# Patient Record
Sex: FEMALE | Race: ASIAN | URBAN - METROPOLITAN AREA
[De-identification: names, ages, dates, MRNs, and addresses within clinical notes are randomized per-mention and may not be internally consistent; named-entity substitution may affect disease eponyms.]

---

## 2019-05-30 PROBLEM — L65.9 HAIR THINNING: Status: ACTIVE | Noted: 2019-05-30

## 2019-05-30 PROBLEM — R53.83 FATIGUE, UNSPECIFIED TYPE: Status: ACTIVE | Noted: 2019-05-30

## 2019-05-30 PROBLEM — L85.3 DRY SKIN: Status: ACTIVE | Noted: 2019-05-30

## 2019-05-30 PROBLEM — F90.9 ATTENTION DEFICIT HYPERACTIVITY DISORDER (ADHD), UNSPECIFIED ADHD TYPE: Status: ACTIVE | Noted: 2019-05-30

## 2019-05-31 PROBLEM — E61.1 LOW IRON: Status: ACTIVE | Noted: 2019-05-31

## 2019-05-31 PROBLEM — E55.9 VITAMIN D DEFICIENCY: Status: ACTIVE | Noted: 2019-05-31

## 2020-06-02 PROBLEM — L65.9 HAIR THINNING: Status: RESOLVED | Noted: 2019-05-30 | Resolved: 2020-06-02

## 2020-06-02 PROBLEM — L85.3 DRY SKIN: Status: RESOLVED | Noted: 2019-05-30 | Resolved: 2020-06-02

## 2024-05-31 ENCOUNTER — OFFICE VISIT (OUTPATIENT)
Facility: CLINIC | Age: 29
End: 2024-05-31
Payer: COMMERCIAL

## 2024-05-31 VITALS
BODY MASS INDEX: 20.49 KG/M2 | OXYGEN SATURATION: 99 % | RESPIRATION RATE: 18 BRPM | HEIGHT: 57 IN | WEIGHT: 95 LBS | DIASTOLIC BLOOD PRESSURE: 80 MMHG | HEART RATE: 101 BPM | SYSTOLIC BLOOD PRESSURE: 128 MMHG

## 2024-05-31 DIAGNOSIS — R73.03 PREDIABETES: Primary | ICD-10-CM

## 2024-05-31 PROCEDURE — 99204 OFFICE O/P NEW MOD 45 MIN: CPT | Performed by: STUDENT IN AN ORGANIZED HEALTH CARE EDUCATION/TRAINING PROGRAM

## 2024-05-31 PROCEDURE — 3008F BODY MASS INDEX DOCD: CPT | Performed by: STUDENT IN AN ORGANIZED HEALTH CARE EDUCATION/TRAINING PROGRAM

## 2024-05-31 PROCEDURE — 3074F SYST BP LT 130 MM HG: CPT | Performed by: STUDENT IN AN ORGANIZED HEALTH CARE EDUCATION/TRAINING PROGRAM

## 2024-05-31 PROCEDURE — 3079F DIAST BP 80-89 MM HG: CPT | Performed by: STUDENT IN AN ORGANIZED HEALTH CARE EDUCATION/TRAINING PROGRAM

## 2024-05-31 RX ORDER — LACTULOSE 10 G/15ML
SOLUTION ORAL
COMMUNITY
Start: 2024-05-24

## 2024-05-31 RX ORDER — DOCUSATE SODIUM 100 MG/1
CAPSULE, LIQUID FILLED ORAL
COMMUNITY

## 2024-05-31 RX ORDER — BECLOMETHASONE DIPROPIONATE HFA 40 UG/1
AEROSOL, METERED RESPIRATORY (INHALATION)
COMMUNITY

## 2024-05-31 RX ORDER — CHOLECALCIFEROL (VITAMIN D3) 125 MCG
CAPSULE ORAL
COMMUNITY

## 2024-05-31 RX ORDER — LEVOMEFOLATE CALCIUM 7.5 MG TABLET
TABLET
COMMUNITY

## 2024-05-31 RX ORDER — ALBUTEROL SULFATE 90 UG/1
2 AEROSOL, METERED RESPIRATORY (INHALATION) EVERY 4 HOURS PRN
COMMUNITY
Start: 2024-05-10

## 2024-05-31 RX ORDER — LISDEXAMFETAMINE DIMESYLATE 30 MG/1
CAPSULE ORAL
COMMUNITY

## 2024-05-31 NOTE — PATIENT INSTRUCTIONS
We'll have you do genetic testing to rule out any question of BHARATHI.   If you ultimately do have any form of BHARATHI, please do make another appointment with me.   Otherwise, if this is \"classic\" prediabetes, please follow with your primary care physician for further management.     Return Visit   [X] No follow up needed for now  [X] After visit summary

## 2024-06-01 NOTE — H&P
Endocrinology Clinic Note    Name: Marni Brennan    Date: 5/31/2024       HISTORY OF PRESENT ILLNESS   Marni Brennan is a 28 year old female with PMHx significant for anxiety, ADD, dermographia who presents for initial endocrine consultation for prediabetes.  The patient recently had lab testing done with an A1c of 6.3%, reports it has been in diabetic range in the past.  Her fasting sugar was 125 with a concurrent insulin level of 4.3, reports that another provider told her that that insulin level was insufficient for her fasting sugar and that she should seek evaluation.  Reports that she and her brother were both diagnosed with hyperglycemia as children and A1c has been mildly elevated all her life.  She has always had a small body habitus (currently weighs 95 pounds) and reports that she was never obese as a child.  Reports that her mother has diabetes which is controlled by diet and also had gestational diabetes.  She is not familiar with the medical history of her grandparents but does not think that either of her grandfathers were diabetic.  She is very interested in genetic testing to ensure that there is no genetic or syndromic basis for her diabetes as she is very active, eats very healthy, etc. and is concerned by her longstanding prediabetes.  She does not use exogenous steroids.  No other acute issues at this time.    PAST MEDICAL HISTORY:   Past Medical History:    ADD (attention deficit disorder)    Anxiety    Dermagraphy    Iron deficiency anemia    Vitamin D deficiency       PAST SURGICAL HISTORY:   No past surgical history on file.    CURRENT MEDICATIONS:    Current Outpatient Medications   Medication Sig Dispense Refill    albuterol 108 (90 Base) MCG/ACT Inhalation Aero Soln Inhale 2 puffs into the lungs every 4 (four) hours as needed for Wheezing.      QVAR REDIHALER 40 MCG/ACT Inhalation Aerosol, Breath Activated       Cholecalciferol (VITAMIN D) 125 MCG (5000 UT) Oral Cap       L-Methylfolate 7.5 MG  Oral Tab       docusate sodium (COLACE) 100 MG Oral Cap       CONSTULOSE 10 GM/15ML Oral Solution 15 mL Orally Once a day 1 days      VYVANSE 30 MG Oral Cap       venlafaxine ER 75 mg       Fexofenadine HCl 180 MG Oral Tab            ALLERGIES:  No Known Allergies    SOCIAL HISTORY:    Social History     Socioeconomic History    Marital status: Single    Number of children: 0   Occupational History    Occupation: consulting   Tobacco Use    Smoking status: Never    Smokeless tobacco: Never   Vaping Use    Vaping status: Never Used   Substance and Sexual Activity    Alcohol use: Not Currently     Comment: Social drinker    Drug use: Never    Sexual activity: Yes     Partners: Male     Birth control/protection: Condom, Paragard     Comment: paragard 2020   Other Topics Concern     Service No    Blood Transfusions No    Seat Belt Yes       FAMILY HISTORY:   Family History   Problem Relation Age of Onset    Other (prediabetes) Mother     High Blood Pressure Father     Dementia Paternal Grandmother         alzheimers    Cancer Paternal Grandfather          REVIEW OF SYSTEMS:  Ten point review of systems has been performed and is otherwise negative and/or non-contributory, except as described above.      PHYSICAL EXAM:   Vitals:    05/31/24 1023   BP: 128/80   Pulse: 101   Resp: 18   SpO2: 99%   Weight: 95 lb (43.1 kg)   Height: 4' 9\" (1.448 m)     BMI: Body mass index is 20.56 kg/m².     CONSTITUTIONAL:  awake, alert, cooperative, no apparent distress, and appears stated age  PSYCH: normal affect  EYES:  No proptosis,  conjunctiva normal  ENT:  Normocephalic, atraumatic  NECK:  Supple, symmetrical, thyroid not enlarged and no tenderness  LUNGS: breathing comfortably  CARDIOVASCULAR:  regular rate   ABDOMEN:  soft  SKIN: + Scarring/lesions on face and neck  EXTREMITIES: no edema      DATA:     Labs from 5/3/2024:  -TFTs normal  -Iron studies normal  -Lipid panel normal  -Fasting glucose 125  -Hemoglobin A1c  6.3%  -Hemoglobin normal  -Vitamin D 29  -Insulin level 4.3      ASSESSMENT AND PLAN:    #Early onset diabetes  -Patient reports she and her brother were both diagnosed with notable hyperglycemia as children and she is continue to have elevated A1c in prediabetic and occasionally diabetic range into adulthood despite having a very lean body type (BMI of approximately 20), controlling her diet strictly, and being very active (daily Pilates)  -Reports a family history of diabetes in her mother, does not know if either of her grandmothers have diabetes as she does not have contact with them but reports that her grandfather's were not diabetic  -She is concerned about NATHAN and other forms of type 1 diabetes  -Extensively reviewed available data with the patient, she does not have evidence of insulin deficiency at this time with appropriate insulin elevation in the face of mild hyperglycemia  -Patient is very interested in genetic testing to rule out any genetic basis of her diabetes, reviewed that she does not have clear signs of BHARATHI at this time (does not have true diabetic range A1c, no suspicious birth history, etc.) however patient would like to have genetic testing done  -Patient would like to have type I evaluation done, will obtain autoantibodies  -If no evidence of a genetic basis for diabetes/BHARATHI, recommend patient continue to manage prediabetes in a conservative manner versus consider metformin therapy which can be done with her primary care physician  -Reviewed diet recommendations  -All questions answered in detail    #Vitamin D deficiency  -Advised patient to take over-the-counter daily vitamin D supplementation    The above plan was discussed in detail with the patient who verbalized understanding and agreement.      A total of 50 minutes was spent today on obtaining history, reviewing pertinent labs, reviewing relevant pathophysiology with patient, evaluating patient, providing multiple treatment  options, and completing documentation and orders.      Saskia Gomez DO  [facility] Endocrinology  5/31/2024     Note to patient: The 21 Century Cures Act makes medical notes like these available to patients in the interest of transparency. However, be advised this is a medical document. It is intended as peer to peer communication. It is written in medical language and may contain abbreviations or verbiage that are unfamiliar. It may appear blunt or direct. Medical documents are intended to carry relevant information, facts as evident, and the clinical opinion of the practitioner.

## 2024-06-02 PROBLEM — R73.03 PREDIABETES: Status: ACTIVE | Noted: 2024-06-02

## 2024-06-03 ENCOUNTER — TELEPHONE (OUTPATIENT)
Facility: CLINIC | Age: 29
End: 2024-06-03

## 2024-06-03 NOTE — TELEPHONE ENCOUNTER
Prateek herrera, please let Marni know that I have ordered some blood work for her as the diagnosis of BHARATHI requires demonstration of negative type I autoantibodies.  If any of these antibodies result positive, we can make a follow-up appointment to discuss.  She already has the referral for genetics if she would like to schedule that at her convenience.     RN phoned patient to discuss, no answer, LVM for call back tomorrow or to review MCM

## 2024-06-05 ENCOUNTER — MED REC SCAN ONLY (OUTPATIENT)
Facility: CLINIC | Age: 29
End: 2024-06-05

## 2024-06-12 NOTE — TELEPHONE ENCOUNTER
RN phoned patient to discuss below, patient verbalized understanding    [facility] [address] [facility]  [address]  [place] [address]  Phone   [phone]  Fax   [phone]    Lab orders faxed to [facility] in [place].    Will await confirmation

## 2024-06-25 ENCOUNTER — TELEPHONE (OUTPATIENT)
Facility: CLINIC | Age: 29
End: 2024-06-25

## 2024-06-25 NOTE — TELEPHONE ENCOUNTER
6/25/24 rcvd via fax from [employer] [facility] [employer]     Notice of Test Delay - Islet Cell AB Screen    Fax in [address]

## 2024-06-25 NOTE — TELEPHONE ENCOUNTER
Fax states Islet Cell Antibody Screen With Reflex to [facility] is on delay, \"please add 2-5 days to turn around time.\".    Routing to Dr. Gomez and DALI.    Closing this encounter.

## 2024-07-03 LAB
GLUTAMIC ACID DECARBOXYLASE 65 AB: <5 IU/ML
ISLET CELL ANTIBODY SCREEN: NEGATIVE
ZINC TRANSPORTER 8 (ZNT8) ANTIBODY: <10 U/ML

## 2024-09-06 ENCOUNTER — GENETICS ENCOUNTER (OUTPATIENT)
Dept: GENETICS | Facility: HOSPITAL | Age: 29
End: 2024-09-06
Attending: STUDENT IN AN ORGANIZED HEALTH CARE EDUCATION/TRAINING PROGRAM
Payer: COMMERCIAL

## 2024-09-06 ENCOUNTER — NURSE ONLY (OUTPATIENT)
Dept: HEMATOLOGY/ONCOLOGY | Facility: HOSPITAL | Age: 29
End: 2024-09-06
Attending: STUDENT IN AN ORGANIZED HEALTH CARE EDUCATION/TRAINING PROGRAM
Payer: COMMERCIAL

## 2024-09-06 DIAGNOSIS — R73.03 PREDIABETES: Primary | ICD-10-CM

## 2024-09-06 DIAGNOSIS — Z83.3 FAMILY HISTORY OF DIABETES MELLITUS: ICD-10-CM

## 2024-09-06 PROCEDURE — 96040 HC GENETIC COUNSELING EA 30 MIN: CPT | Performed by: GENETIC COUNSELOR, MS

## 2024-09-06 PROCEDURE — 36415 COLL VENOUS BLD VENIPUNCTURE: CPT

## 2024-09-06 NOTE — PROGRESS NOTES
Patient Name: Marni Brennan  YOB: 1995  Date of Visit: 2024    Reason for visit: Ms. Brennan was seen for the purposes of genetic counseling due to a clinical suspicion of BHARATHI.    Referring Provider: Saskia Gomez    Medical History: Ms. Brennan is a pleasant 28 year old female presenting with a history of hyperglycemia as early as middle school. She notes she did not routinely go to the doctor as a child. Her A1C is elevated, most recently in 2024 at 6.3 in the pre-diabetes range. She reports prior A1C levels in the pre-diabetic to diabetic range. She has a lean body habitus and eats well/exercises (BMI 33, 4'9\", 95 lb). She has had negative type 1 auto-antibody tests. Her endocrinologist referred her for a genetic evaluation for BHARATHI given her clinical picture and family history.     Ms. Brennan achieved menarche at approximately 11-12 years of age, is pre-menopausal, and has never been pregnant.     Relevant Family History: Ms. Brennan reports limited contact with her family and her family does not routinely go to the doctor, so family medical hx info is more limited.     Ms. Brennan has 1 brother (31y) he also has a h/o hyperglycemia beginning in childhood (middle school), he has a thin body habitus as well, he does not routinely go to the doctor.    Ms. Brennan's mother (61y) has a h/o hyperglycemia and suspected diabetes which is diet controlled, she had gestational diabetes as well and has a non-specific vision issue. There are 6-7 maternal aunts/uncle, no/limited health hx information is known about them and their children. The maternal grandmother is >50y. The maternal grandfather is .     Ms. Brennan's father (60y) has a h/o HTN, he does not go to the doctor, he had a prior MI. There are 6-7 maternal aunts/uncle, no/limited health hx information is known about them and their children. The paternal grandfather reported  young dt a cancer, possibly stomach. The paternal grandmother is living.     The rest  of the family history is negative for other significant genetic conditions, cancers, consanguinity, or birth defects of any kind. She denies any h/o kidney/UT defects or dysfunction, no major progressive ocular issues were noted, no deafness. See scanned pedigree for full family history reported during the session.    Ms. Brennan's maternal and paternal ethnicity is [place].     Summary:   I review the following information with Ms. Brennan. She was given printed information about BHARATHI as well.   Maturity onset diabetes of the young (BHARATHI)  Maturity onset diabetes of the young (BHARATHI), a primary pancreatic beta-cell defect, is the most common type of monogenic diabetes, accounting for up to 5% of young adults diagnosed with diabetes. BHARATHI typically presents in lean young adults before 25 years with an autosomal dominant family history. BHARATHI patients have continued production of endogenous insulin, absence of beta-cell autoimmunity and absence of signs of insulin resistance. As this non-insulin dependent type of diabetes is frequently misdiagnosed as type 1 or type 2 diabetes, a timely and accurate molecular diagnosis of BHARATHI is essential to treatment decisions, prognosis, family screening, and obstetric management of gestational diabetes. BHARATHI has been conventionally classified into 14 types in terms of the causative genes.    The early clarification of the genetic status of asymptomatic family members at risk for BHARATHI enables routine surveillance to identify hyperglycemia enables prompt and appropriate treatment based on the type of BHARATHI. For those at increased risk, early intervention reduces the long-term risk of hyperglycemia-related microvascular and macrovascular complications. Families with individuals with BHARATHI as well as the much more common type 1 and type 2 diabetes can be assured that each individual will receive the appropriate surveillance and therapy for his/her diagnosis.    Genetics/Inheritance   BHARATHI is  inherited in an autosomal dominant manner. Proteins encoded by BHARATHI-associated genes include nuclear transcription factors controlling pancreatic development (HNF1A, HNF4A, HNF1B, PDX1, KLF11, PAX4, NEUROD1, and RFX6), glucokinase (GCK, a glucose sensor in pancreatic beta-cells), insulin (INS), kinase stimulating insulin synthesis and secretion (BLK), the enzyme carboxyl vania lipase (TEDDY), the ATP-sensitive potassium (KATP) channels (ABCC8 and KCNJ11), and a protein involved in glucose metabolism (APPL1).     Approximately 20% of all BHARATHI has been attributed to pathogenic variants in ten other genes - some of which were designated before the availability of large-scale genetic testing and thus may be incorrectly associated with BHARATHI. Pathogenic variants in HNF1A, HNF4A, GCK, and HNF1B genes account for over 80% of all known BHARATHI cases while defects in other genes are rare causes of BHARATHI. A portion of BHARATHI may be caused by pathogenic variants in yet-to-be-identified genes or complex molecular alterations in the known BHARATHI-related genes that were not detected by previous genetic testing methods. Pathogenic variants in HNF1A, HNF4A, GCK, and HNF1B genes account for over 80% of all known BHARATHI cases while defects in other genes are rare causes of BHARATHI.     If testing is performed, three results are possible: positive, negative, and variant of uncertain significance.  A positive result indicates a pathogenic variant (harmful gene mutation) has been identified, and there is an increased risk for the condition(s) associated with the specific gene.  Since pathogenic variants in most BHARATHI susceptibility genes are inherited in an autosomal dominant fashion, siblings and children of individuals with a pathogenic variant have a 50% risk of carrying the same familial pathogenic variant as well.      A negative test result would indicate that no pathogenic variant was identified in a disease susceptibility gene.  While testing  detects gene mutations, it is possible for a genetic variant to be present and go undetected.  It is also possible for a genetic variant to be located in a gene other than those being tested.     A variant of uncertain significance means that a change has been identified a disease susceptibility gene; however, it is uncertain if the variant is pathogenic or a non-deleterious (benign) change.  With time, the variant may be reclassified as either pathogenic or benign.    Ms. Brennan appeared to understand the information presented. On the day of the visit Ms. Brennan elected to proceed with genetic testing for BHARATHI. My office will call Ms. Mika as soon as results are received; post-test counseling can be scheduled at that time. Thank you for allowing me to participate in the care of your patient; please do not hesitate to contact my office if you have any questions or concerns, [phone].    Plan:   Blood was drawn and sent out for [facility] [employer]'s monogenic diabetes panel (covers BHARATHI)(28 genes; TAT: ~2 weeks).    The Genetics office will call MsMauricio Mika when results are available.  Recommendations for Ms. Brennan and family members will depend the above genetic testing results.      Send to: Saskia Gomez/[facility]  Time spent with patient: 35 minutes      Brittany Mcneil MS, [facility]

## 2024-09-11 ENCOUNTER — APPOINTMENT (OUTPATIENT)
Dept: GENETICS | Facility: HOSPITAL | Age: 29
End: 2024-09-11
Attending: STUDENT IN AN ORGANIZED HEALTH CARE EDUCATION/TRAINING PROGRAM
Payer: COMMERCIAL

## 2024-09-17 ENCOUNTER — TELEPHONE (OUTPATIENT)
Dept: HEMATOLOGY/ONCOLOGY | Facility: HOSPITAL | Age: 29
End: 2024-09-17

## 2024-09-17 ENCOUNTER — GENETICS ENCOUNTER (OUTPATIENT)
Dept: HEMATOLOGY/ONCOLOGY | Facility: HOSPITAL | Age: 29
End: 2024-09-17

## 2024-09-17 DIAGNOSIS — Z15.89: Primary | ICD-10-CM

## 2024-09-17 DIAGNOSIS — E13.9 MODY (MATURITY ONSET DIABETES MELLITUS IN YOUNG) (HCC): ICD-10-CM

## 2024-09-17 NOTE — TELEPHONE ENCOUNTER
TONO asking Marni to call back to review her genetic testing results. My call back number is [phone].     -Brittany

## 2024-09-17 NOTE — TELEPHONE ENCOUNTER
Patient called back and I reviewed her genetic testing results with her. See genetics result note for details.

## 2024-09-17 NOTE — PROGRESS NOTES
Patient Name: Marni Brennan  YOB: 1995    Referring Provider:  Saskia Gomez DO      Reason for Referral:  Ms. Brennan had genetic testing performed on 9/6/2024 because of a clinical suspicion of BHARATHI.      Genetic Testing Result:  Positive. Ms. 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  No other known pathogenic variants were found in a total of 28 genes on the [facility] monogenic diabetes panel including: ABCC8, APPL1, BLK, YBA2TH0, FOXP3, GATA4, GATA6, GCK, GLIS3, HNF1A, HNF1B, HNF4A, YPU3XU1, INS, KCNJ11, KLF11, MNX1, NEUROD1, NEUROG3, NKX2-2, PAX4, PDX1, PPARG, PTF1A, RFX6, HYJ31M4, WFS1, ZFP57. Please refer to the report from [facility] for additional testing information.  These results were discussed with Ms. Brennan via telephone on 9/17/2024.    Summary and Plan:   Ms. 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 This information is important for Ms. Brennan and relatives to be aware of. She should discuss this result with her doctors.     The limitations of the testing were discussed with Ms. Brennan including the chance that additional pathogenic variants in a gene other than those included in this panel might be the cause of condition(s) in Ms. Brennan or in relatives.    GCK-associated maturity onset diabetes of the young (BHARATHI)   A single inactivating (loss-of-function) mutation in the GCK (glucokinase) gene, like Ms. Brennan has, leads to a type of autosomal dominant hyperglycemia (elevated blood sugar) called maturity-onset diabetes of the young (BHARATHI). The prevalence of GCK-BHARATHI has been estimated at 1:1,000 individuals [Chapis et al 2014].     GCK-BHARATHI (aka: MODY2) is characterized by mild, stable fasting hyperglycemia (5.5-8.0 mmol/L;  mg/dL) present at birth. Beta-cell function typically shows minimal  deterioration with increasing age (as in the general population). Insulin secretion and regulation are usually fully intact. The raised fasting glucose is not correlated with BMI so occurs to a similar degree in both slim and obese individuals.    Affected individuals are generally asymptomatic and the hyperglycemia is often discovered during routine medical examinations, such as in pregnancy or family screening when BHARATHI is suspected. GCK-related BHARATHI is often managed with diet alone, with fewer than 2% of individuals requiring insulin therapy.     Diabetes-related complications, such as retinopathy, are extremely uncommon as the hyperglycemia is mild, stable, and under homeostatic regulation. Additionally, the level of hyperglycemia is often lower than the threshold above which the risk of diabetes complications increases; and people with GCK-BHARATHI typically do not have the additional burden of the metabolic syndrome, with weight, lipid profile, and blood pressure being comparable with the general population [Chapis et al 2015].    The early clarification of the genetic status of asymptomatic family members at risk for GCK-BHARATHI enables routine surveillance to identify hyperglycemia enables prompt and appropriate treatment based on the type of BHARATHI.    Pregnancy considerations in a woman with GCK-BHARATHI   Should Ms. Brennan desire pregnancy, it is recommended that she discuss the prenatal plan of care for women with GCK-BHARATHI with her medical providers, such as her Endocrinologist and OB/GYN. Insulin may be required for pregnant women with GCK-BHARATHI. Typically, recommendations for treatment are based on the known or inferred fetal genotype [Debby et al 2001, Chapis et al 2015].    Inheritance  GCK-BHARATHI is inherited in an autosomal dominant manner. This means that an individual with a single GCK pathogenic variant has a 50% chance of passing the condition onto their biological children. Biological full siblings would also  be expected to have a 50% chance of having inherited the pathogenic variant. This result allows for the identification of at-risk relatives who can pursue testing for this specific familial variant. Many cases are inherited from a parent, in which case parents would have a 50% chance to have the same familial variant, but some cases can occur spontaneously (i.e., an individual with a pathogenic variant has parents who do not have it).  In rare cases, a person may inherit two inactivating (loss-of-function) mutations in the GCK gene which causes an autosomal recessive form of hyperglycemia called permanent  diabetes mellitus type 1 (PNDM1). Permanent  diabetes mellitus type 1 (PNDM1) is a rare autosomal recessive disorder characterized by severe hyperglycemia which requires insulin treatment soon after birth. The disorder results from a complete lack of glucokinase; total absence of basal insulin release was observed as well. Individuals with GCK-KATHARINE are carriers of PNDM1. For there to be a risk of PNDM1 in offspring, both an individual and their partner would each have to carry a single loss of function mutation in the GCK gene; in such a case, the risk to have an affected child with PNDM1 would be 25%. Individuals like Ms. Brennan who have GCK-KATHARINE may wish to consider having their reproductive partner screened for GCK pathogenic variants as well to determine the risk of having a child with PNDM1.     GeneReviews: https://www.ncbi.nlm.nih.gov/books/WYW767276/#katharine-ov.Management_of_MODY_Based_on_Gene     Implications for family members   We discussed that each of Ms. Brennan's sibling(s), parents, and any future biological children has a 50% chance of carrying the same GCK pathogenic variant as Ms. Brennan.  I encouraged Ms. Brennan to share the genetic test results with at risk family members on both her maternal and paternal sides of the family, as it is not yet known for sure which side of the family the GCK  variant was inherited from, so that they may have their health risks assessed by a physician and/or genetic counselor. I provided Ms. Brennan with a family letter that she may choose to share with relatives to facilitate this conversation.     Ms. Brennan is advised to discuss the genetic testing result with her doctors. Ms. Brennan is also encouraged to contact me on an annual basis to learn if there have been any updates in genetic information that would apply or if the personal and/or family history changes. Please do not hesitate to contact my office if you have any questions or concerns, [phone].     Brittany Mcneil MS, CGC

## 2024-09-18 PROBLEM — Z15.89: Status: ACTIVE | Noted: 2024-09-01

## 2024-09-20 ENCOUNTER — OFFICE VISIT (OUTPATIENT)
Facility: CLINIC | Age: 29
End: 2024-09-20
Payer: COMMERCIAL

## 2024-09-20 VITALS
SYSTOLIC BLOOD PRESSURE: 118 MMHG | HEART RATE: 64 BPM | HEIGHT: 57 IN | OXYGEN SATURATION: 99 % | BODY MASS INDEX: 20.49 KG/M2 | DIASTOLIC BLOOD PRESSURE: 60 MMHG | WEIGHT: 95 LBS | RESPIRATION RATE: 18 BRPM

## 2024-09-20 DIAGNOSIS — Z15.89: Primary | ICD-10-CM

## 2024-09-20 DIAGNOSIS — E13.9 MODY 2, UNCOMPLICATED, CONTROLLED (HCC): ICD-10-CM

## 2024-09-20 PROCEDURE — 3078F DIAST BP <80 MM HG: CPT | Performed by: STUDENT IN AN ORGANIZED HEALTH CARE EDUCATION/TRAINING PROGRAM

## 2024-09-20 PROCEDURE — 3008F BODY MASS INDEX DOCD: CPT | Performed by: STUDENT IN AN ORGANIZED HEALTH CARE EDUCATION/TRAINING PROGRAM

## 2024-09-20 PROCEDURE — 3074F SYST BP LT 130 MM HG: CPT | Performed by: STUDENT IN AN ORGANIZED HEALTH CARE EDUCATION/TRAINING PROGRAM

## 2024-09-20 PROCEDURE — 99215 OFFICE O/P EST HI 40 MIN: CPT | Performed by: STUDENT IN AN ORGANIZED HEALTH CARE EDUCATION/TRAINING PROGRAM

## 2024-09-20 RX ORDER — SACROSIDASE 8500 [IU]/ML
SOLUTION ORAL
COMMUNITY

## 2024-09-20 NOTE — PATIENT INSTRUCTIONS
Let's do labs in February as a baseline and please have a diabetic eye exam done at your convenience.   We'll plan to check in once a year to make sure it doesn't evolve.     Return Visit   [  ] Physician in late February 2025 (video)  [  ] After visit summary   [  ] Fasting/8AM labs

## 2024-09-28 PROBLEM — E13.9: Status: ACTIVE | Noted: 2024-09-28

## 2024-09-29 NOTE — PROGRESS NOTES
Endocrinology Clinic Note    Name: Marni Brennan    Date: 9/20/24      HISTORY OF PRESENT ILLNESS   Marni Brennan is a 28 year old female with PMHx significant for anxiety, ADD, dermographia who presents for initial endocrine consultation for prediabetes.  The patient recently had lab testing done with an A1c of 6.3%, reports it has been in diabetic range in the past.  Her fasting sugar was 125 with a concurrent insulin level of 4.3, reports that another provider told her that that insulin level was insufficient for her fasting sugar and that she should seek evaluation.  Reports that she and her brother were both diagnosed with hyperglycemia as children and A1c has been mildly elevated all her life.  She has always had a small body habitus (currently weighs 95 pounds) and reports that she was never obese as a child.  Reports that her mother has diabetes which is controlled by diet and also had gestational diabetes.  She is not familiar with the medical history of her grandparents but does not think that either of her grandfathers were diabetic.  She is very interested in genetic testing to ensure that there is no genetic or syndromic basis for her diabetes as she is very active, eats very healthy, etc. and is concerned by her longstanding prediabetes.  She does not use exogenous steroids.  No other acute issues at this time.    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 Pt here to discuss significance of this finding. 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 No pertinent surgical  history.    CURRENT MEDICATIONS:    Current Outpatient Medications   Medication Sig Dispense Refill    SUCRAID 8500 UNIT/ML Oral Solution       albuterol 108 (90 Base) MCG/ACT Inhalation Aero Soln Inhale 2 puffs into the lungs every 4 (four) hours as needed for Wheezing.      QVAR REDIHALER 40 MCG/ACT Inhalation Aerosol, Breath Activated       Cholecalciferol (VITAMIN D) 125 MCG (5000 UT) Oral Cap       L-Methylfolate 7.5 MG Oral Tab       CONSTULOSE 10 GM/15ML Oral Solution 15 mL Orally Once a day 1 days      VYVANSE 30 MG Oral Cap       venlafaxine ER 75 mg       Fexofenadine HCl 180 MG Oral Tab            ALLERGIES:  Allergies   Allergen Reactions    Milk DIARRHEA       SOCIAL HISTORY:    Social History     Socioeconomic History    Marital status: Single    Number of children: 0   Occupational History    Occupation: consulting   Tobacco Use    Smoking status: Never    Smokeless tobacco: Never   Vaping Use    Vaping status: Never Used   Substance and Sexual Activity    Alcohol use: Not Currently     Comment: Social drinker    Drug use: Never    Sexual activity: Yes     Partners: Male     Birth control/protection: Condom, Paragard     Comment: paragard 2020   Other Topics Concern     Service No    Blood Transfusions No    Seat Belt Yes       FAMILY HISTORY:   Family History   Problem Relation Age of Onset    Other (prediabetes) Mother     High Blood Pressure Father     Dementia Paternal Grandmother         alzheimers    Cancer Paternal Grandfather         possibly stomach cancer         REVIEW OF SYSTEMS:  Ten point review of systems has been performed and is otherwise negative and/or non-contributory, except as described above.      PHYSICAL EXAM:   Vitals:    09/20/24 0935   BP: 118/60   Pulse: 64   Resp: 18   SpO2: 99%   Weight: 95 lb (43.1 kg)   Height: 4' 9\" (1.448 m)     BMI: Body mass index is 20.56 kg/m².     CONSTITUTIONAL:  awake, alert, cooperative, no apparent distress, and appears stated  age  PSYCH: normal affect  EYES:  No proptosis,  conjunctiva normal  ENT:  Normocephalic, atraumatic  NECK:  Supple, symmetrical, thyroid not enlarged and no tenderness  LUNGS: breathing comfortably  CARDIOVASCULAR:  regular rate   ABDOMEN:  soft  SKIN: + Scarring/lesions on face and neck  EXTREMITIES: no edema      DATA:     Labs from 5/3/2024:  -TFTs normal  -Iron studies normal  -Lipid panel normal  -Fasting glucose 125  -Hemoglobin A1c 6.3%  -Hemoglobin normal  -Vitamin D 29  -Insulin level 4.3     Latest Reference Range & Units 06/14/24 09:42   GLUTAMIC ACID DECARBOXYLASE 65 AB <5 IU/mL <5   ISLET CELL ANTIBODY SCREEN NEGATIVE  NEGATIVE      Latest Reference Range & Units 06/14/24 09:42   ZINC TRANSPORTER 8 (ZNT8) ANTIBODY <15 U/mL <10       Genetic testing result 9/17/24:  Genetic Testing Result:  Positive. Ms. Brennan was found to have a single GCK c.556C>T (p.Bir175*) pathogenic variant.  No other known pathogenic variants were found in a total of 28 genes on the [employer] monogenic diabetes panel including: ABCC8, APPL1, BLK, ERZ7JW4, FOXP3, GATA4, GATA6, GCK, GLIS3, HNF1A, HNF1B, HNF4A, EUV7AS5, INS, KCNJ11, KLF11, MNX1, NEUROD1, NEUROG3, NKX2-2, PAX4, PDX1, PPARG, PTF1A, RFX6, LOY18I5, WFS1, ZFP57. Please refer to the report from [employer] for additional testing information.      Full report under media tab.        ASSESSMENT AND PLAN:    #MODY2 (GCK-BHARATHI)  -Patient reports she and her brother were both diagnosed with notable hyperglycemia as children and she is continue to have elevated A1c in prediabetic and occasionally diabetic range into adulthood despite having a very lean body type (BMI of approximately 20), controlling her diet strictly, and being very active (daily Pilates)  -Reports a family history of diabetes in her mother, does not know if either of her grandmothers have diabetes as she does not have contact with them but reports that her grandfather's were not diabetic  -Type 1 antibodies  negative  -Genetic testing in Sept 2024 revealed positive mutation in GCK, consistent with diagnosis of GCK-BHARATHI/MODY2  -GCK-BHARATHI/MODY2 is caused by a defect in the expression of GCK, which phosphorylates glucose to glucose-6-phosphate and acts as a glucose sensor, result in a higher threshold for glucose-stimulated insulin secretion. Resulting mild hyperglycemia is often stable and mild and does not typically lead to vascular complications seen with other forms of diabetes.   -MODY2 is typically managed with diet alone - reviewed low carb diet recommendations for the pt  -No pharmacotherapy is recommended at this time  -Recommend baseline screening for DM complications - DM eye exam, lipids, microalb/Cr with yearly screening thereafter as well as yearly A1c  -Reviewed implications with regard to fertility/reproduction and potential passing of this gene to offspring. Insulin management may be required during pregnancy.  -All questions answered in detail    #Vitamin D deficiency  -Advised patient to take over-the-counter daily vitamin D supplementation    The above plan was discussed in detail with the patient who verbalized understanding and agreement.      A total of 45 minutes was spent today on obtaining history, reviewing pertinent labs, reviewing relevant pathophysiology with patient, evaluating patient, providing multiple treatment options, and completing documentation and orders.      Saskia Gomez DO  [facility]  9/20/24     Note to patient: The 21 Century Cures Act makes medical notes like these available to patients in the interest of transparency. However, be advised this is a medical document. It is intended as peer to peer communication. It is written in medical language and may contain abbreviations or verbiage that are unfamiliar. It may appear blunt or direct. Medical documents are intended to carry relevant information, facts as evident, and the clinical opinion of the practitioner.

## 2025-02-18 LAB
CHOL/HDLC RATIO: 2.4 (CALC)
CHOLESTEROL, TOTAL: 174 MG/DL
CREATININE, RANDOM URINE: 202 MG/DL (ref 20–275)
HDL CHOLESTEROL: 73 MG/DL
HEMOGLOBIN A1C: 6.3 % OF TOTAL HGB
LDL-CHOLESTEROL: 86 MG/DL (CALC)
MICROALBUMIN/CREATININE RATIO, RANDOM URINE: 4 MG/G CREAT
MICROALBUMIN: 0.8 MG/DL
NON-HDL CHOLESTEROL: 101 MG/DL (CALC)
TRIGLYCERIDES: 61 MG/DL

## 2025-02-25 ENCOUNTER — TELEPHONE (OUTPATIENT)
Facility: CLINIC | Age: 30
End: 2025-02-25

## 2025-02-28 ENCOUNTER — TELEMEDICINE (OUTPATIENT)
Facility: CLINIC | Age: 30
End: 2025-02-28
Payer: COMMERCIAL

## 2025-02-28 DIAGNOSIS — E13.9 MODY 2, UNCOMPLICATED, CONTROLLED (HCC): Primary | ICD-10-CM

## 2025-02-28 PROCEDURE — 98006 SYNCH AUDIO-VIDEO EST MOD 30: CPT | Performed by: STUDENT IN AN ORGANIZED HEALTH CARE EDUCATION/TRAINING PROGRAM

## 2025-02-28 NOTE — PROGRESS NOTES
Endocrinology Clinic Note    Name: Marni Brennan    Date: 2/28/25    Patient verbally consents to a Video/Telephone service for this visit.  Patient understands and accepts financial responsibility for any deductible, co-insurance and/or co-pays associated with this service.      HISTORY OF PRESENT ILLNESS   Marni Brennan is a 29 year old female with PMHx significant for anxiety, ADD, dermographia who presents for initial endocrine consultation for prediabetes.  The patient recently had lab testing done with an A1c of 6.3%, reports it has been in diabetic range in the past.  Her fasting sugar was 125 with a concurrent insulin level of 4.3, reports that another provider told her that that insulin level was insufficient for her fasting sugar and that she should seek evaluation.  Reports that she and her brother were both diagnosed with hyperglycemia as children and A1c has been mildly elevated all her life.  She has always had a small body habitus (currently weighs 95 pounds) and reports that she was never obese as a child.  Reports that her mother has diabetes which is controlled by diet and also had gestational diabetes.  She is not familiar with the medical history of her grandparents but does not think that either of her grandfathers were diabetic.  She is very interested in genetic testing to ensure that there is no genetic or syndromic basis for her diabetes as she is very active, eats very healthy, etc. and is concerned by her longstanding prediabetes.  She does not use exogenous steroids.  No other acute issues at this time.    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 Pt here to discuss significance of this finding. 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    Interval history February 2025:  -Doing well, following with ENT for chronic congestion  -Seeing GI for sucrase deficiency, they suggested starting on an  enzyme to help breakdown sugars but advised her this may cause her sugar to spike  -Labs in January showed normal lipid panel, normal microalbumin/creatinine, and A1c of 6.3% (stable from 2023)  -No symptoms of hyperglycemia    PAST MEDICAL HISTORY:   Past Medical History:    ADD (attention deficit disorder)    Anxiety    Dermagraphy    Iron deficiency anemia    Monoallelic mutation of GCK gene    +GCK c.556C>T (p.[id number]*) heterozygous pathogenic variant, GCK-BHARATHI, report in media tab    Vitamin D deficiency       PAST SURGICAL HISTORY:   No past surgical history on file.    CURRENT MEDICATIONS:    Current Outpatient Medications   Medication Sig Dispense Refill    SUCRAID 8500 UNIT/ML Oral Solution       albuterol 108 (90 Base) MCG/ACT Inhalation Aero Soln Inhale 2 puffs into the lungs every 4 (four) hours as needed for Wheezing.      QVAR REDIHALER 40 MCG/ACT Inhalation Aerosol, Breath Activated       Cholecalciferol (VITAMIN D) 125 MCG (5000 UT) Oral Cap       L-Methylfolate 7.5 MG Oral Tab       CONSTULOSE 10 GM/15ML Oral Solution 15 mL Orally Once a day 1 days      VYVANSE 30 MG Oral Cap       venlafaxine ER 75 mg       Fexofenadine HCl 180 MG Oral Tab            ALLERGIES:  Allergies   Allergen Reactions    Milk DIARRHEA       SOCIAL HISTORY:    Social History     Socioeconomic History    Marital status: Single    Number of children: 0   Occupational History    Occupation: consulting   Tobacco Use    Smoking status: Never    Smokeless tobacco: Never   Vaping Use    Vaping status: Never Used   Substance and Sexual Activity    Alcohol use: Not Currently     Comment: Social drinker    Drug use: Never    Sexual activity: Yes     Partners: Male     Birth control/protection: Condom, Paragard     Comment: paragard 2020   Other Topics Concern     Service No    Blood Transfusions No    Seat Belt Yes       FAMILY HISTORY:   Family History   Problem Relation Age of Onset    Other (prediabetes) Mother     High  Blood Pressure Father     Dementia Paternal Grandmother         alzheimers    Cancer Paternal Grandfather         possibly stomach cancer         REVIEW OF SYSTEMS:  Ten point review of systems has been performed and is otherwise negative and/or non-contributory, except as described above.      PHYSICAL EXAM- limited due to telemedicine encounter    Constitutional Not in acute distress  Pulmonary: no wheezing heard, no coughing on the phone. Speaking in full sentences.  Neurological:  Alert and oriented to person, place and time.   Psychiatric: Normal affect, mood and behavior appropriate        DATA:     Labs from 5/3/2024:  -TFTs normal  -Iron studies normal  -Lipid panel normal  -Fasting glucose 125  -Hemoglobin A1c 6.3%  -Hemoglobin normal  -Vitamin D 29  -Insulin level 4.3     Latest Reference Range & Units 06/14/24 09:42   GLUTAMIC ACID DECARBOXYLASE 65 AB <5 IU/mL <5   ISLET CELL ANTIBODY SCREEN NEGATIVE  NEGATIVE      Latest Reference Range & Units 06/14/24 09:42   ZINC TRANSPORTER 8 (ZNT8) ANTIBODY <15 U/mL <10       Genetic testing result 9/17/24:    Genetic Testing Result:  Positive. Ms. Brennan was found to have a single GCK c.556C>T (p.Lib173*) pathogenic variant.  No other known pathogenic variants were found in a total of 28 genes on the [employer] monogenic diabetes panel including: ABCC8, APPL1, BLK, TOM3VI5, FOXP3, GATA4, GATA6, GCK, GLIS3, HNF1A, HNF1B, HNF4A, FMC3BJ1, INS, KCNJ11, KLF11, MNX1, NEUROD1, NEUROG3, NKX2-2, PAX4, PDX1, PPARG, PTF1A, RFX6, UNX16K1, WFS1, ZFP57. Please refer to the report from [employer] for additional testing information.      Full report under media tab.        ASSESSMENT AND PLAN:    #MODY2 (GCK-BHARATHI)  -Patient reports she and her brother were both diagnosed with notable hyperglycemia as children and she is continue to have elevated A1c in prediabetic and occasionally diabetic range into adulthood despite having a very lean body type (BMI of approximately 20), controlling her  diet strictly, and being very active (daily Pilates)  -Reports a family history of diabetes in her mother, does not know if either of her grandmothers have diabetes as she does not have contact with them but reports that her grandfather's were not diabetic  -Type 1 antibodies negative  -Genetic testing in Sept 2024 revealed positive mutation in GCK, consistent with diagnosis of GCK-BHARATHI/MODY2  -GCK-BHARATHI/MODY2 is caused by a defect in the expression of GCK, which phosphorylates glucose to glucose-6-phosphate and acts as a glucose sensor, result in a higher threshold for glucose-stimulated insulin secretion. Resulting mild hyperglycemia is often stable and mild and does not typically lead to vascular complications seen with other forms of diabetes.   -MODY2 is typically managed with diet alone - reviewed low carb diet recommendations for the pt  -No pharmacotherapy is recommended at this time  -Recommend baseline screening for DM complications - DM eye exam, lipids, microalb/Cr with yearly screening thereafter as well as yearly A1c  -Patient is planning to start sucrase enzyme to help her breakdown sugars in the context of her sucrase deficiency, reviewed that this may cause some rise in her sugar but if it is mild, we can continue to observe.  Patient will start the enzyme this week and we will repeat A1c after 3 months and discuss trends at that time.  -Reviewed implications with regard to fertility/reproduction and potential passing of this gene to offspring. Insulin management may be required during pregnancy.  -All questions answered in detail    #Vitamin D deficiency  -Advised patient to take over-the-counter daily vitamin D supplementation    The above plan was discussed in detail with the patient who verbalized understanding and agreement.      Saskia Gomez DO  [facility]  2/28/2025     Note to patient: The 21 Century Cures Act makes medical notes like these available to patients in the interest of  transparency. However, be advised this is a medical document. It is intended as peer to peer communication. It is written in medical language and may contain abbreviations or verbiage that are unfamiliar. It may appear blunt or direct. Medical documents are intended to carry relevant information, facts as evident, and the clinical opinion of the practitioner.

## 2025-05-23 LAB — HEMOGLOBIN A1C: 6.5 %

## 2025-05-28 ENCOUNTER — TELEMEDICINE (OUTPATIENT)
Facility: CLINIC | Age: 30
End: 2025-05-28
Payer: COMMERCIAL

## 2025-05-28 DIAGNOSIS — E13.9 MODY 2, UNCOMPLICATED, CONTROLLED (HCC): Primary | ICD-10-CM

## 2025-05-28 PROCEDURE — 98006 SYNCH AUDIO-VIDEO EST MOD 30: CPT | Performed by: STUDENT IN AN ORGANIZED HEALTH CARE EDUCATION/TRAINING PROGRAM

## 2025-05-28 NOTE — PROGRESS NOTES
Endocrinology Clinic Note    Name: Marni Brennan    Date: 5/28/2025  Patient verbally consents to a Video/Telephone service for this visit.  Patient understands and accepts financial responsibility for any deductible, co-insurance and/or co-pays associated with this service.      HISTORY OF PRESENT ILLNESS   Marni Brennan is a 29 year old female with PMHx significant for anxiety, ADD, dermographia who presents for initial endocrine consultation for prediabetes.  The patient recently had lab testing done with an A1c of 6.3%, reports it has been in diabetic range in the past.  Her fasting sugar was 125 with a concurrent insulin level of 4.3, reports that another provider told her that that insulin level was insufficient for her fasting sugar and that she should seek evaluation.  Reports that she and her brother were both diagnosed with hyperglycemia as children and A1c has been mildly elevated all her life.  She has always had a small body habitus (currently weighs 95 pounds) and reports that she was never obese as a child.  Reports that her mother has diabetes which is controlled by diet and also had gestational diabetes.  She is not familiar with the medical history of her grandparents but does not think that either of her grandfathers were diabetic.  She is very interested in genetic testing to ensure that there is no genetic or syndromic basis for her diabetes as she is very active, eats very healthy, etc. and is concerned by her longstanding prediabetes.  She does not use exogenous steroids.  No other acute issues at this time.    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 Pt here to discuss significance of this finding. 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    Interval history February 2025:  -Doing well, following with ENT for chronic congestion  -Seeing GI for sucrase deficiency, they suggested starting on an  enzyme to help breakdown sugars but advised her this may cause her sugar to spike  -Labs in January showed normal lipid panel, normal microalbumin/creatinine, and A1c of 6.3% (stable from 2023)  -No symptoms of hyperglycemia    Interval history May 2025:  - Feeling much better from a GI standpoint (bloating, abdominal pain) since taking her sucrase enzyme  - A1c ritchie slightly to 6.5% over the last 3 months, patient has no symptoms of hyperglycemia  - No other acute complaints    PAST MEDICAL HISTORY:   Past Medical History:    ADD (attention deficit disorder)    Anxiety    Dermagraphy    Iron deficiency anemia    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    CURRENT MEDICATIONS:    Current Outpatient Medications   Medication Sig Dispense Refill    SUCRAID 8500 UNIT/ML Oral Solution       albuterol 108 (90 Base) MCG/ACT Inhalation Aero Soln Inhale 2 puffs into the lungs every 4 (four) hours as needed for Wheezing.      QVAR REDIHALER 40 MCG/ACT Inhalation Aerosol, Breath Activated       Cholecalciferol (VITAMIN D) 125 MCG (5000 UT) Oral Cap       L-Methylfolate 7.5 MG Oral Tab       CONSTULOSE 10 GM/15ML Oral Solution 15 mL Orally Once a day 1 days      VYVANSE 30 MG Oral Cap       venlafaxine ER 75 mg       Fexofenadine HCl 180 MG Oral Tab            ALLERGIES:  Allergies   Allergen Reactions    Milk DIARRHEA       SOCIAL HISTORY:    Social History     Socioeconomic History    Marital status: Single    Number of children: 0   Occupational History    Occupation: consulting   Tobacco Use    Smoking status: Never    Smokeless tobacco: Never   Vaping Use    Vaping status: Never Used   Substance and Sexual Activity    Alcohol use: Not Currently     Comment: Social drinker    Drug use: Never    Sexual activity: Yes     Partners: Male     Birth  control/protection: Condom, Paragard     Comment: paragard 2020   Other Topics Concern     Service No    Blood Transfusions No    Seat Belt Yes       FAMILY HISTORY:   Family History   Problem Relation Age of Onset    Other (prediabetes) Mother     High Blood Pressure Father     Dementia Paternal Grandmother         alzheimers    Cancer Paternal Grandfather         possibly stomach cancer         REVIEW OF SYSTEMS:  Ten point review of systems has been performed and is otherwise negative and/or non-contributory, except as described above.      PHYSICAL EXAM- limited due to telemedicine encounter    Constitutional Not in acute distress  Pulmonary: no wheezing heard, no coughing on the phone. Speaking in full sentences.  Neurological:  Alert and oriented to person, place and time.   Psychiatric: Normal affect, mood and behavior appropriate        DATA:     Labs from 5/3/2024:  -TFTs normal  -Iron studies normal  -Lipid panel normal  -Fasting glucose 125  -Hemoglobin A1c 6.3%  -Hemoglobin normal  -Vitamin D 29  -Insulin level 4.3     Latest Reference Range & Units 06/14/24 09:42   GLUTAMIC ACID DECARBOXYLASE 65 AB <5 IU/mL <5   ISLET CELL ANTIBODY SCREEN NEGATIVE  NEGATIVE      Latest Reference Range & Units 06/14/24 09:42   ZINC TRANSPORTER 8 (ZNT8) ANTIBODY <15 U/mL <10       Genetic testing result 9/17/24:    Genetic Testing Result:  Positive. Ms. Brennan was found to have a single GCK c.556C>T (p.Htu401*) pathogenic variant.  No other known pathogenic variants were found in a total of 28 genes on the [facility] monogenic diabetes panel including: ABCC8, APPL1, BLK, MBS2VP7, FOXP3, GATA4, GATA6, GCK, GLIS3, HNF1A, HNF1B, HNF4A, KVK0DX1, INS, KCNJ11, KLF11, MNX1, NEUROD1, NEUROG3, NKX2-2, PAX4, PDX1, PPARG, PTF1A, RFX6, UTU13W2, WFS1, ZFP57. Please refer to the report from [employer] for additional testing information.      Full report under media tab.        ASSESSMENT AND PLAN:    #MODY2 (GCK-BHARATHI)  -Patient reports  she and her brother were both diagnosed with notable hyperglycemia as children and she is continue to have elevated A1c in prediabetic and occasionally diabetic range into adulthood despite having a very lean body type (BMI of approximately 20), controlling her diet strictly, and being very active (daily Pilates)  -Reports a family history of diabetes in her mother, does not know if either of her grandmothers have diabetes as she does not have contact with them but reports that her grandfather's were not diabetic  -Type 1 antibodies negative  -Genetic testing in Sept 2024 revealed positive mutation in GCK, consistent with diagnosis of GCK-BHARATHI/MODY2  -GCK-BHARATHI/MODY2 is caused by a defect in the expression of GCK, which phosphorylates glucose to glucose-6-phosphate and acts as a glucose sensor, result in a higher threshold for glucose-stimulated insulin secretion. Resulting mild hyperglycemia is often stable and mild and does not typically lead to vascular complications seen with other forms of diabetes.   -MODY2 is typically managed with diet alone - reviewed low carb diet recommendations for the pt  -No pharmacotherapy is recommended at this time  -Recommend baseline screening for DM complications - DM eye exam, lipids, microalb/Cr with yearly screening thereafter as well as yearly A1c  -No notable A1c increase with addition of sucrase enzyme, continue observation  -Patient would like to repeat A1c in 6 months for her knowledge, ordered  -Reviewed implications with regard to fertility/reproduction and potential passing of this gene to offspring. Insulin management may be required during pregnancy.  -All questions answered in detail    #Vitamin D deficiency  -Advised patient to take over-the-counter daily vitamin D supplementation    The above plan was discussed in detail with the patient who verbalized understanding and agreement.      Saskia Gomez DO  [facility]  5/28/25     Note to patient: The 21 Century  Cures Act makes medical notes like these available to patients in the interest of transparency. However, be advised this is a medical document. It is intended as peer to peer communication. It is written in medical language and may contain abbreviations or verbiage that are unfamiliar. It may appear blunt or direct. Medical documents are intended to carry relevant information, facts as evident, and the clinical opinion of the practitioner.